# Patient Record
Sex: MALE | Race: ASIAN | NOT HISPANIC OR LATINO
[De-identification: names, ages, dates, MRNs, and addresses within clinical notes are randomized per-mention and may not be internally consistent; named-entity substitution may affect disease eponyms.]

---

## 2017-02-23 ENCOUNTER — APPOINTMENT (OUTPATIENT)
Dept: NEUROLOGY | Facility: CLINIC | Age: 27
End: 2017-02-23

## 2017-02-23 VITALS
BODY MASS INDEX: 23.3 KG/M2 | HEART RATE: 84 BPM | SYSTOLIC BLOOD PRESSURE: 130 MMHG | HEIGHT: 66 IN | DIASTOLIC BLOOD PRESSURE: 71 MMHG | WEIGHT: 145 LBS

## 2017-02-23 DIAGNOSIS — G83.89 OTHER SPECIFIED PARALYTIC SYNDROMES: ICD-10-CM

## 2017-09-14 ENCOUNTER — APPOINTMENT (OUTPATIENT)
Dept: NEUROLOGY | Facility: CLINIC | Age: 27
End: 2017-09-14

## 2023-02-27 ENCOUNTER — EMERGENCY (EMERGENCY)
Facility: HOSPITAL | Age: 33
LOS: 1 days | Discharge: HOME | End: 2023-02-27
Attending: STUDENT IN AN ORGANIZED HEALTH CARE EDUCATION/TRAINING PROGRAM | Admitting: STUDENT IN AN ORGANIZED HEALTH CARE EDUCATION/TRAINING PROGRAM
Payer: MEDICAID

## 2023-02-27 VITALS
RESPIRATION RATE: 18 BRPM | HEART RATE: 77 BPM | TEMPERATURE: 98 F | DIASTOLIC BLOOD PRESSURE: 66 MMHG | OXYGEN SATURATION: 100 % | SYSTOLIC BLOOD PRESSURE: 105 MMHG

## 2023-02-27 VITALS
SYSTOLIC BLOOD PRESSURE: 121 MMHG | OXYGEN SATURATION: 100 % | HEART RATE: 84 BPM | DIASTOLIC BLOOD PRESSURE: 85 MMHG | TEMPERATURE: 98 F | RESPIRATION RATE: 18 BRPM

## 2023-02-27 PROCEDURE — 99284 EMERGENCY DEPT VISIT MOD MDM: CPT

## 2023-02-27 RX ORDER — DEXAMETHASONE 0.5 MG/5ML
6 ELIXIR ORAL ONCE
Refills: 0 | Status: COMPLETED | OUTPATIENT
Start: 2023-02-27 | End: 2023-02-27

## 2023-02-27 RX ORDER — ACETAMINOPHEN 500 MG
975 TABLET ORAL ONCE
Refills: 0 | Status: COMPLETED | OUTPATIENT
Start: 2023-02-27 | End: 2023-02-27

## 2023-02-27 RX ORDER — SODIUM CHLORIDE 9 MG/ML
1000 INJECTION INTRAMUSCULAR; INTRAVENOUS; SUBCUTANEOUS ONCE
Refills: 0 | Status: COMPLETED | OUTPATIENT
Start: 2023-02-27 | End: 2023-02-27

## 2023-02-27 RX ORDER — METOCLOPRAMIDE HCL 10 MG
10 TABLET ORAL ONCE
Refills: 0 | Status: COMPLETED | OUTPATIENT
Start: 2023-02-27 | End: 2023-02-27

## 2023-02-27 RX ADMIN — Medication 6 MILLIGRAM(S): at 16:16

## 2023-02-27 RX ADMIN — SODIUM CHLORIDE 1000 MILLILITER(S): 9 INJECTION INTRAMUSCULAR; INTRAVENOUS; SUBCUTANEOUS at 16:08

## 2023-02-27 RX ADMIN — Medication 975 MILLIGRAM(S): at 16:08

## 2023-02-27 RX ADMIN — Medication 10 MILLIGRAM(S): at 16:08

## 2023-02-27 NOTE — ED PROVIDER NOTE - PATIENT PORTAL LINK FT
You can access the FollowMyHealth Patient Portal offered by [facility] by registering at the following website: [email or web address]. By joining [employer]’s FollowMyHealth portal, you will also be able to view your health information using other applications (apps) compatible with our system.

## 2023-02-27 NOTE — ED PROVIDER NOTE - PROGRESS NOTE DETAILS
Josué Salazar DO: patient reports resolution of symptoms. stable for dc with out-patient follow up. Return precautions were discussed with patient at bedside and patient expressed understanding.

## 2023-02-27 NOTE — ED PROVIDER NOTE - OBJECTIVE STATEMENT
32-year-old male past medical history sinus surgery, newly diagnosed seizure disorder last week now on Keppra presents to ED for persistent headache as well as requesting EEG.  Patient reports headache since Tuesday when he had a seizure episode.  Brought to [place] was in hospital for 2 days had head CT, labs, MRI and was discharged home with outpatient neurology follow-up, scheduled for June.  Patient reports was post to have EEG while there but they were not able to have it performed and he was discharged prior to its completion.  Denies any seizure episodes since.  Reports mild to moderate headache on the right side of his head since last week.  Has taken Tylenol several days ago but none today.  Denies any vision or hearing changes, numbness or weakness, chest pain, shortness of breath, fever, chills, dizziness.  Does drink 2 drinks approximately 5 days a week however no drink in several weeks he reports. 32-year-old male past medical history sinus surgery, newly diagnosed seizure disorder last week now on Keppra presents to ED for persistent headache as well as requesting EEG.  Patient reports headache since Tuesday when he had a seizure episode.  Brought to [place] was in hospital for 2 days had head CT, labs, MRI and was discharged home with outpatient neurology follow-up, scheduled for June.  Patient reports was told he needed EEG but they were not able to have it performed and he was discharged prior to its completion, with plan for out-patient EEG.  Denies any seizure episodes since.  Reports mild to moderate headache on the right side of his head since last week.  Has taken Tylenol several days ago but none today.  Denies any vision or hearing changes, numbness or weakness, chest pain, shortness of breath, fever, chills, dizziness.  Does drink 2 drinks approximately 5 days a week however no drink in several weeks he reports.

## 2023-02-27 NOTE — ED ADULT TRIAGE NOTE - CHIEF COMPLAINT QUOTE
Pt states he was seen at [facility] ED a week ago after he developed a headache and fell. Pt states he was given medications, which he took, but his headache came back. Pt arrived from [place] 7 days ago. States he had a nose surgery in [place] a month ago and has been having seizures since. Per [facility] paperwork pt was given an Rx for Keppra, folic acid, and multivitamin. Pt denies history of seizures. Denies drinking alcohol for 1 week. When questioned if he drinks alcohol every day, he denies.

## 2023-02-27 NOTE — ED ADULT TRIAGE NOTE - ESI TRIAGE ACUITY LEVEL, MLM
1.  Hold the Coumadin today. The new Coumadin plan now will be 2 mg on every Friday and then 4 mg on Saturday through Thursday  2.   I have decreased the metoprolol down to 50 mg 1/2 twice daily 3

## 2023-02-27 NOTE — ED PROVIDER NOTE - NEURO NEGATIVE STATEMENT, MLM
eprescribed new script for ms contin x 1 week and decreased oxycodone from 1-2 tabs every 4 hours to 1 tab every 4 hours   no loss of consciousness, no gait abnormality, + headache, no sensory deficits, and no weakness.

## 2023-02-27 NOTE — ED PROVIDER NOTE - NSFOLLOWUPINSTRUCTIONS_ED_ALL_ED_FT
1) Please follow-up with your primary care doctor within the next 2-3 days.  Please call today for an appointment. We will call you for a Neurologist appointment.   2) If you have any worsening of symptoms or any other concerns please return to the ED immediately.  3) Please continue taking your home medications as directed.  4) You may have been given a copy of your labs and/or imaging.  Please go over these with your primary care doctor.

## 2023-02-27 NOTE — ED PROVIDER NOTE - PHYSICAL EXAMINATION
wnl GEN: no acute respiratory distress. nontoxic, speaking comfortably in full sentences, ambulating with steady gait.  HEENT: NCAT. face symmetrical. PERRL 4mm, EOMI, MMM, oropharynx wnl.  Neck: no JVD, trachea midline, no lymphadenopathy, FROM  CV: RRR. +S1S2, no murmur. 2+ pulses in 4 extremities, cap refill <2 sec  Chest: CTA B/l. no wheezing, rales, rhonchi. no retractions. good air movement.  ABD: +BS, soft, non distended, non tender. No guarding/rebound.  MSK: No clubbing, cyanosis, edema. FROM of all extremities. no tenderness to palpation. No midline or paraspinal tenderness. no spinal step-offs.  Neuro: AAOX3.  CN 2-12 intact; Sensation intact, motor 5/5 throughout.  SKIN: No erythema, lesions or rash

## 2023-02-27 NOTE — ED PROVIDER NOTE - CLINICAL SUMMARY MEDICAL DECISION MAKING FREE TEXT BOX
32-year-old male past medical history sinus surgery, first-time seizure last week with diagnosed seizure disorder at [place] now on Keppra.  Patient reports for persistent headache since last week.  Plan to give symptom relief for headache and reassess.  Patient without recurrence of seizure or focal neurologic deficit on exam.  Patient also requesting EEG, did not have an inpatient and was recommended outpatient follow-up for EEG.  Explained to patient unable to perform EEG in the emergency department but can give referral for neurology follow-up which she accepted.

## 2025-03-04 ENCOUNTER — EMERGENCY (EMERGENCY)
Facility: HOSPITAL | Age: 35
LOS: 1 days | Discharge: HOME | End: 2025-03-04
Attending: EMERGENCY MEDICINE | Admitting: EMERGENCY MEDICINE
Payer: MEDICAID

## 2025-03-04 VITALS
RESPIRATION RATE: 20 BRPM | DIASTOLIC BLOOD PRESSURE: 80 MMHG | TEMPERATURE: 98 F | SYSTOLIC BLOOD PRESSURE: 114 MMHG | HEART RATE: 68 BPM | OXYGEN SATURATION: 100 %

## 2025-03-04 VITALS
OXYGEN SATURATION: 98 % | DIASTOLIC BLOOD PRESSURE: 87 MMHG | HEIGHT: 67 IN | TEMPERATURE: 97 F | HEART RATE: 67 BPM | SYSTOLIC BLOOD PRESSURE: 131 MMHG | RESPIRATION RATE: 14 BRPM | WEIGHT: 177.91 LBS

## 2025-03-04 LAB
FLUAV AG NPH QL: SIGNIFICANT CHANGE UP
FLUBV AG NPH QL: SIGNIFICANT CHANGE UP
RSV RNA NPH QL NAA+NON-PROBE: SIGNIFICANT CHANGE UP
SARS-COV-2 RNA SPEC QL NAA+PROBE: SIGNIFICANT CHANGE UP

## 2025-03-04 PROCEDURE — 99284 EMERGENCY DEPT VISIT MOD MDM: CPT

## 2025-03-04 RX ORDER — LIDOCAINE HYDROCHLORIDE 20 MG/ML
1 JELLY TOPICAL ONCE
Refills: 0 | Status: COMPLETED | OUTPATIENT
Start: 2025-03-04 | End: 2025-03-04

## 2025-03-04 RX ORDER — ACETAMINOPHEN 500 MG/5ML
650 LIQUID (ML) ORAL ONCE
Refills: 0 | Status: COMPLETED | OUTPATIENT
Start: 2025-03-04 | End: 2025-03-04

## 2025-03-04 RX ORDER — IBUPROFEN 200 MG
600 TABLET ORAL ONCE
Refills: 0 | Status: COMPLETED | OUTPATIENT
Start: 2025-03-04 | End: 2025-03-04

## 2025-03-04 RX ADMIN — Medication 650 MILLIGRAM(S): at 02:30

## 2025-03-04 RX ADMIN — LIDOCAINE HYDROCHLORIDE 1 PATCH: 20 JELLY TOPICAL at 02:30

## 2025-03-04 RX ADMIN — Medication 600 MILLIGRAM(S): at 02:30

## 2025-03-04 NOTE — ED ADULT NURSE NOTE - OBJECTIVE STATEMENT
Total Joint Surgery Preoperative Chart Review      Patient ID:  Caro Ji  [id number]  74 y.o.  1952  Surgeon: Dr. Jordan Going  Date of Surgery: The linked surgery was not found. Please check manually. Procedure: Total Right Knee Arthroplasty  Primary Care Physician: Cathryn Dill [phone]  Specialty Physician(s):      Subjective:   Caro Ji is a 74 y.o. WHITE OR  male who presents for preoperative evaluation for Total Right Knee arthroplasty. This is a preoperative chart review note based on data collected by the nurse at the surgical Pre-Assessment visit. Past Medical History:   Diagnosis Date    Arthritis     Atrial fibrillation ([address] Dx 2019    BRIT and DCCV performed (19) and he has maintained NSR since. EKG dated 19 shows NRS    BPH associated with nocturia     managed with medication    Chronic pain     lower back and legs    H/O echocardiogram 2019    EF 55-65%    LBBB (left bundle branch block)     Stress (19) \"no ischemia,\" Echo (19) EF 55-65%      Past Surgical History:   Procedure Laterality Date    HX AFIB ABLATION      cardioversion     HX COLONOSCOPY      5 polyps removed    HX HERNIA REPAIR Right     [facility] Dr. Lorraine Lamb    HX KNEE ARTHROSCOPY Right     States \"either  or \"     Family History   Problem Relation Age of Onset    Arrhythmia Mother     Cancer Father       Social History     Tobacco Use    Smoking status: Former Smoker     Last attempt to quit: 2016     Years since quittin.7    Smokeless tobacco: Never Used   Substance Use Topics    Alcohol use: Not on file     Comment: occasional       Prior to Admission medications    Medication Sig Start Date End Date Taking? Authorizing Provider   aspirin delayed-release 81 mg tablet Take 81 mg by mouth daily. Yes Provider, Historical   ibuprofen (ADVIL) 200 mg tablet Take 600 mg by mouth every six (6) hours as needed for Pain. Yes Provider, Historical   HYDROcodone-acetaminophen (NORCO) 7.5-325 mg per tablet Take 1 Tab by mouth every six (6) hours as needed for Pain. Yes Provider, Historical   celecoxib (CELEBREX) 200 mg capsule Take 200 mg by mouth daily. Provider, Historical   tamsulosin (FLOMAX) 0.4 mg capsule Take 0.4 mg by mouth daily. Provider, Historical     No Known Allergies       Objective:     Physical Exam:   Patient Vitals for the past 24 hrs:   Temp Pulse Resp BP SpO2   09/05/19 1011 98.2 °F (36.8 °C) 69 18 (!) 148/91 94 %       ECG:    EKG Results     None          Data Review:   Labs:   Recent Results (from the past 24 hour(s))   CBC W/O DIFF    Collection Time: 09/05/19 10:45 AM   Result Value Ref Range    WBC 5.0 4.3 - 11.1 K/uL    RBC 4.26 4.23 - 5.6 M/uL    HGB 14.1 13.6 - 17.2 g/dL    HCT 40.9 (L) 41.1 - 50.3 %    MCV 96.0 79.6 - 97.8 FL    MCH 33.1 (H) 26.1 - 32.9 PG    MCHC 34.5 31.4 - 35.0 g/dL    RDW 12.2 11.9 - 14.6 %    PLATELET 588 091 - 802 K/uL    MPV 9.3 (L) 9.4 - 12.3 FL    ABSOLUTE NRBC 0.00 0.0 - 0.2 K/uL   METABOLIC PANEL, BASIC    Collection Time: 09/05/19 10:45 AM   Result Value Ref Range    Sodium 141 136 - 145 mmol/L    Potassium 4.1 3.5 - 5.1 mmol/L    Chloride 109 (H) 98 - 107 mmol/L    CO2 24 21 - 32 mmol/L    Anion gap 8 7 - 16 mmol/L    Glucose 97 65 - 100 mg/dL    BUN 13 8 - 23 MG/DL    Creatinine 0.75 (L) 0.8 - 1.5 MG/DL    GFR est AA >60 >60 ml/min/1.73m2    GFR est non-AA >60 >60 ml/min/1.73m2    Calcium 9.1 8.3 - 10.4 MG/DL   PROTHROMBIN TIME + INR    Collection Time: 09/05/19 10:45 AM   Result Value Ref Range    Prothrombin time 12.9 11.7 - 14.5 sec    INR 1.0     PTT    Collection Time: 09/05/19 10:45 AM   Result Value Ref Range    aPTT 29.2 24.7 - 39.8 SEC         Problem List:  )There is no problem list on file for this patient.       Total Joint Surgery Pre-Assessment Recommendations:           Recommend continuous saturation monitoring hours of sleep, during hospitalization.         Signed By: YAMIL Huber    September 5, 2019 Pt received to INT04. Pt is a 34 year old male with no pertinent hx. Pt presented to ED c/o lower back pain, flu like symptoms, decreased PO intake. Pt states he had fever at home, denies taking temperature - afebrile at this time. denies chest pain, SOB, headache, dizziness, abdominal pain, n/v/d, urinary symptoms, fevers/chills, numbness/tingling.   Pt is A&Ox4, ambulatory without assistance. neuro/sensory intact. airway patent, speaking clearly in full sentences. breathing is even and unlabored. abdomen is soft, nontender, nondistended. no edema noted. skin is intact. spontaneous movement of all extremities. VS as noted in flowsheet. lab collected and sent. medications administered as ordered. comfort measures provided. stretcher set in lowest position, call bell within reach, safety maintained.

## 2025-03-04 NOTE — ED PROVIDER NOTE - ATTENDING CONTRIBUTION TO CARE
35 y/o male, states no medical history, presenting for 3 days of chills, body aches, back pain, and poor appetite. No known sick contacts. Patient works as a . No other symptoms including chest pain, palpitations, SOB, abdominal pain, urinary issues, lightheadedness, dizziness, or weakness. Also no fecal or urinary incontinence. Patient able to ambulate with no issues.

## 2025-03-04 NOTE — ED ADULT TRIAGE NOTE - TEMPERATURE IN CELSIUS (DEGREES C)
Patient is scheduled for an establish care visit with Dr. Whitfield on April 9th. Will schedule MWV at that appointment.   36.3

## 2025-03-04 NOTE — ED PROVIDER NOTE - NSFOLLOWUPINSTRUCTIONS_ED_ALL_ED_FT
Please follow up with your doctor within 1 week. You may bring copies of your results with you (provided in your discharge paperwork).     - You may take 500-1000 mg acetaminophen (Tylenol) every 6 hours, as needed for pain. Do not take more than 4000 mg in a 24 hour period. Be aware many over the counter and prescription medications also contain acetaminophen (Tylenol).  - You may take 600 mg ibuprofen every 8 hours, with food, as needed for pain.  - You may also use a lidocaine patch every 12 hours as needed for pain.    PLEASE RETURN TO THE EMERGENCY DEPARTMENT if you experience worsening of your symptoms or any of the following: fever, uncontrollable headache, loss of consciousness, chest pain, difficulty breathing, uncontrollable nausea/vomiting, weakness/numbness/tingling.    We hope you feel better! Thank you for trusting us with your care!

## 2025-03-04 NOTE — ED PROVIDER NOTE - PROGRESS NOTE DETAILS
Saint Cheikh, DO (PGY2): negative viral swab. Patient stable. pain better. Will dc with PCP follow up. All questions answered.

## 2025-03-04 NOTE — ED PROVIDER NOTE - CLINICAL SUMMARY MEDICAL DECISION MAKING FREE TEXT BOX
Saint Cheikh, DO (PGY2): Well-appearing 34-year-old male, presenting to the ED today for 3 days of chills, body aches, back pain, poor appetite.  Patient is hemodynamically stable.  He is afebrile.  Physical exam benign.  Concern for viral syndrome.  Low concerns for serious cause of back pain, likely MSK from sitting for long periods of time as .  Plan for viral swab, pain control.  Will reassess.  Anticipate discharge home.

## 2025-03-04 NOTE — ED PROVIDER NOTE - OBJECTIVE STATEMENT
Saint Cheikh, DO (PGY2): 33 y/o male, states no medical history, presenting for 3 days of chills, body aches, back pain, and poor appetite. No known sick contacts. Patient works as a . No other symptoms including chest pain, palpitations, SOB, abdominal pain, urinary issues, lightheadedness, dizziness, or weakness. Also no fecal or urinary incontinence. Patient able to ambulate with no issues.

## 2025-03-04 NOTE — ED PROVIDER NOTE - PHYSICAL EXAMINATION
GENERAL: no acute distress, non-toxic appearing  HEAD: normocephalic, atraumatic  HEENT: oral mucosa moist, full ROM of neck  CARDIAC: regular rate rhythm, normal S1/S2  CHEST: CTA BL, no wheeze or crackles  ABDOMEN: normal BS, soft, no tenderness  EXTREMITY: no gross deformity, no edema, good perfusion   MSK: no midline spinal tenderness throughout   NEURO: alert and orientedx3, no focal deficits, gait normal

## 2025-03-04 NOTE — ED ADULT NURSE NOTE - NSFALLUNIVINTERV_ED_ALL_ED
Bed/Stretcher in lowest position, wheels locked, appropriate side rails in place/Call bell, personal items and telephone in reach/Instruct patient to call for assistance before getting out of bed/chair/stretcher/Non-slip footwear applied when patient is off stretcher/[place] to call system/Physically safe environment - no spills, clutter or unnecessary equipment/Purposeful proactive rounding/Room/bathroom lighting operational, light cord in reach

## 2025-03-04 NOTE — ED PROVIDER NOTE - PATIENT PORTAL LINK FT
You can access the FollowMyHealth Patient Portal offered by [facility] by registering at the following website: [email or web address]. By joining [employer]’s FollowMyHealth portal, you will also be able to view your health information using other applications (apps) compatible with our system.